# Patient Record
Sex: MALE | Race: WHITE | URBAN - METROPOLITAN AREA
[De-identification: names, ages, dates, MRNs, and addresses within clinical notes are randomized per-mention and may not be internally consistent; named-entity substitution may affect disease eponyms.]

---

## 2019-06-05 ENCOUNTER — CONVERSION ENCOUNTER (OUTPATIENT)
Dept: NEUROLOGY | Facility: CLINIC | Age: 54
End: 2019-06-05

## 2019-06-05 ENCOUNTER — OFFICE VISIT CONVERTED (OUTPATIENT)
Dept: NEUROSURGERY | Facility: CLINIC | Age: 54
End: 2019-06-05
Attending: PHYSICIAN ASSISTANT

## 2019-06-27 ENCOUNTER — OFFICE VISIT CONVERTED (OUTPATIENT)
Dept: NEUROSURGERY | Facility: CLINIC | Age: 54
End: 2019-06-27
Attending: PHYSICIAN ASSISTANT

## 2021-05-15 VITALS
SYSTOLIC BLOOD PRESSURE: 135 MMHG | DIASTOLIC BLOOD PRESSURE: 91 MMHG | WEIGHT: 175.12 LBS | HEIGHT: 69 IN | HEART RATE: 74 BPM | BODY MASS INDEX: 25.94 KG/M2

## 2021-05-15 VITALS
BODY MASS INDEX: 25.7 KG/M2 | HEART RATE: 59 BPM | SYSTOLIC BLOOD PRESSURE: 124 MMHG | WEIGHT: 173.5 LBS | DIASTOLIC BLOOD PRESSURE: 87 MMHG | HEIGHT: 69 IN

## 2023-10-17 ENCOUNTER — OFFICE VISIT (OUTPATIENT)
Dept: NEUROSURGERY | Facility: CLINIC | Age: 58
End: 2023-10-17
Payer: MEDICAID

## 2023-10-17 VITALS
DIASTOLIC BLOOD PRESSURE: 83 MMHG | SYSTOLIC BLOOD PRESSURE: 162 MMHG | HEIGHT: 69 IN | BODY MASS INDEX: 27.64 KG/M2 | WEIGHT: 186.6 LBS

## 2023-10-17 DIAGNOSIS — M48.02 FORAMINAL STENOSIS OF CERVICAL REGION: Primary | ICD-10-CM

## 2023-10-17 RX ORDER — LEVOCETIRIZINE DIHYDROCHLORIDE 5 MG/1
5 TABLET, FILM COATED ORAL EVERY EVENING
COMMUNITY

## 2023-10-17 RX ORDER — ATORVASTATIN CALCIUM 20 MG/1
20 TABLET, FILM COATED ORAL NIGHTLY
COMMUNITY

## 2023-10-17 RX ORDER — LISINOPRIL 20 MG/1
20 TABLET ORAL DAILY
COMMUNITY

## 2023-10-17 RX ORDER — PANTOPRAZOLE SODIUM 40 MG/1
40 TABLET, DELAYED RELEASE ORAL DAILY
COMMUNITY

## 2023-10-17 RX ORDER — GABAPENTIN 300 MG/1
300 CAPSULE ORAL 3 TIMES DAILY
COMMUNITY
Start: 2023-10-09

## 2023-10-17 RX ORDER — BUSPIRONE HYDROCHLORIDE 30 MG/1
30 TABLET ORAL 3 TIMES DAILY
COMMUNITY

## 2023-10-17 NOTE — PROGRESS NOTES
"Chief Complaint  Neck Pain    Subjective          Vasu Cardoza who is a 58 y.o. year old male who presents to [facility] for Evaluation of the Spine.     The patient complains of pain located in the lumbar spine, cervical spine and left arm.  Patients states the pain has been present for 1 month in the neck and the arm for 2 weeks.  The pain came on acutely.  His lower back pain has been present a lot longer (several months or more). The pain scale level is 5.  The pain  radiates into the 2nd and 3rd digit on the left .  The pain is constant and waxing/waning and described as sharp, dull, aching, burning, and throbbing.  The back pain has overall eased off. The pain is worse at no particular time of day. Patient states  laying flat down or stretching worsens the pain.  Patient states  propping up the arm  makes the pain better.    Associated Symptoms Include: Numbness and Tingling  Conservative Interventions Include:  PT and NSAIDs , Gabapentin    Was this the result of an injury or accident? : No    History of Previous Spinal Surgery?: No    This patient  reports that he has never smoked. He has never used smokeless tobacco.    Review of Systems   Musculoskeletal:  Positive for myalgias and neck pain.   Neurological:  Positive for numbness.        Objective   Vital Signs:   /83 (BP Location: Left arm, Patient Position: Sitting)   Ht 175.3 cm (69\")   Wt 84.6 kg (186 lb 9.6 oz)   BMI 27.56 kg/m²       Physical Exam  Constitutional:       Appearance: He is normal weight.   Neck:      Comments: Decreased ROM with some increase in pain, particularly with extension.   Pulmonary:      Effort: Pulmonary effort is normal.   Neurological:      Mental Status: He is alert.      Sensory: No sensory deficit.      Motor: No weakness.      Deep Tendon Reflexes: Reflexes normal (1/4, neg Hoffmans).   Psychiatric:         Mood and Affect: Mood normal.          Result Review : "       I personally reviewed the patient's MRI scan which shows some C6-7 foraminal narrowing with a possible small disc herniation to the left. He has multiple small disc bulges.     Assessment and Plan    Diagnoses and all orders for this visit:    1. Foraminal stenosis of cervical region (Primary)    He will continue with his PT and f/u in 4 weeks. He can add traction back to the PT.     He will try to slowly add back in the activities he has been restricting, but avoid heavy jarring activities.       Follow Up   Return in about 1 month (around 11/17/2023).  Patient was given instructions and counseling regarding his condition or for health maintenance advice. Please see specific information pulled into the AVS if appropriate.

## 2023-11-21 ENCOUNTER — OFFICE VISIT (OUTPATIENT)
Dept: NEUROSURGERY | Facility: CLINIC | Age: 58
End: 2023-11-21
Payer: MEDICAID

## 2023-11-21 VITALS
WEIGHT: 192.8 LBS | DIASTOLIC BLOOD PRESSURE: 85 MMHG | HEIGHT: 69 IN | SYSTOLIC BLOOD PRESSURE: 133 MMHG | BODY MASS INDEX: 28.56 KG/M2

## 2023-11-21 DIAGNOSIS — M48.02 FORAMINAL STENOSIS OF CERVICAL REGION: Primary | ICD-10-CM

## 2024-01-04 ENCOUNTER — OFFICE VISIT (OUTPATIENT)
Dept: NEUROSURGERY | Facility: CLINIC | Age: 59
End: 2024-01-04
Payer: MEDICAID

## 2024-01-04 VITALS
SYSTOLIC BLOOD PRESSURE: 138 MMHG | WEIGHT: 198.5 LBS | HEIGHT: 69 IN | BODY MASS INDEX: 29.4 KG/M2 | DIASTOLIC BLOOD PRESSURE: 79 MMHG

## 2024-01-04 DIAGNOSIS — M48.02 FORAMINAL STENOSIS OF CERVICAL REGION: Primary | ICD-10-CM

## 2024-01-04 NOTE — PROGRESS NOTES
Vasu Cardoza is a 58 y.o. male that presents with Neck Pain       He had a cervical epidural. He has some tingling into the whole hand (intermittent). He has started to work out some without any increase in pain. He rates his current level of pain at about 5.    Neck Pain   Associated symptoms include numbness.       Review of Systems   Musculoskeletal:  Positive for neck pain.   Neurological:  Positive for numbness.        Vitals:    01/04/24 0825   BP: 138/79        He is awake and alert.     Oriented X 3.    Triceps appears well preserved.        Assessment and Plan {CC Problem List  Visit Diagnosis  ROS  Review (Popup)  [facility] Maintenance  Quality  BestPractice  Medications  SmartSets  SnapShot Encounters  Media :23}   Problem List Items Addressed This Visit    None  Visit Diagnoses       Foraminal stenosis of cervical region    -  Primary        He had some improvement with his first CESB. He would like to try and avoid surgery if possible. He will pursue at least one additional CESB and if not helping, consider surgery at that.     He will call after his next injection to give a progress report.     Follow Up {Instructions Charge Capture  Follow-up Communications :23}   No follow-ups on file.

## 2024-06-16 ENCOUNTER — HOSPITAL ENCOUNTER (EMERGENCY)
Facility: HOSPITAL | Age: 59
Discharge: HOME | End: 2024-06-16
Attending: EMERGENCY MEDICINE | Admitting: EMERGENCY MEDICINE
Payer: MEDICAID

## 2024-06-16 VITALS
DIASTOLIC BLOOD PRESSURE: 80 MMHG | TEMPERATURE: 98 F | HEART RATE: 76 BPM | WEIGHT: 177.69 LBS | OXYGEN SATURATION: 100 % | RESPIRATION RATE: 16 BRPM | SYSTOLIC BLOOD PRESSURE: 127 MMHG | HEIGHT: 69 IN | BODY MASS INDEX: 26.32 KG/M2

## 2024-06-16 DIAGNOSIS — B02.29 HERPES ZOSTER INVOLVING CERVICAL DERMATOME: Primary | ICD-10-CM

## 2024-06-16 PROCEDURE — 99283 EMERGENCY DEPT VISIT LOW MDM: CPT

## 2024-06-16 RX ORDER — OXYCODONE AND ACETAMINOPHEN 7.5; 325 MG/1; MG/1
1 TABLET ORAL ONCE
Status: COMPLETED | OUTPATIENT
Start: 2024-06-16 | End: 2024-06-16

## 2024-06-16 RX ORDER — IBUPROFEN 800 MG/1
800 TABLET ORAL EVERY 8 HOURS PRN
Qty: 20 TABLET | Refills: 0 | Status: SHIPPED | OUTPATIENT
Start: 2024-06-16

## 2024-06-16 RX ORDER — VALACYCLOVIR HYDROCHLORIDE 1 G/1
1000 TABLET, FILM COATED ORAL 3 TIMES DAILY
Qty: 21 TABLET | Refills: 0 | Status: SHIPPED | OUTPATIENT
Start: 2024-06-16

## 2024-06-16 RX ORDER — HYDROCODONE BITARTRATE AND ACETAMINOPHEN 7.5; 325 MG/1; MG/1
1 TABLET ORAL EVERY 6 HOURS PRN
Qty: 12 TABLET | Refills: 0 | Status: SHIPPED | OUTPATIENT
Start: 2024-06-16

## 2024-06-16 RX ADMIN — OXYCODONE HYDROCHLORIDE AND ACETAMINOPHEN 1 TABLET: 7.5; 325 TABLET ORAL at 15:15

## 2024-06-16 NOTE — DISCHARGE INSTRUCTIONS
Looks like you have a case of shingles (herpes zoster) causing the rash and pain shooting down the left side of your neck and shoulder, for which you should take the antiviral medication (valacyclovir) as directed this week and also try to use some ibuprofen for pain and only use the stronger pain medicine as needed for severe pain.    Also take the methylprednisolone steroid you were prescribed earlier as an anti-inflammatory.    Avoid any close contact with  infants or pregnant females.

## 2024-06-16 NOTE — ED PROVIDER NOTES
Time: 3:06 PM EDT  Date of encounter:  6/16/2024  Independent Historian/Clinical History and Information was obtained by:   Patient and Family    History is limited by: N/A    Chief Complaint: Left-sided neck pain, rash      History of Present Illness:  Patient is a 59 y.o. year old male who presents to the emergency department for evaluation of acute left-sided neck pain, red rash down the left side of the neck towards the shoulder for the past couple days.    He is already seen by urgent care twice for this neck pain and diagnosed with possible cervical radiculopathy or muscle spasm and initially started on Flexeril with no relief.    Just earlier he was at urgent care and started on methylprednisolone but not on antiviral yet.    He is not having any fevers, but mostly complains of left-sided neck pain and pain with movement.        HPI    Patient Care Team  Primary Care Provider: Snellen, Danielle, APRN    Past Medical History:     No Known Allergies  Past Medical History:   Diagnosis Date    Basal cell carcinoma     Cervical disc disorder 09/15/2023    Hyperlipidemia     Hypertension     Lumbosacral disc disease      Past Surgical History:   Procedure Laterality Date    BASAL CELL CARCINOMA EXCISION      CHOLECYSTECTOMY      KNEE SURGERY       Family History   Problem Relation Age of Onset    Hyperlipidemia Father        Home Medications:  Prior to Admission medications    Medication Sig Start Date End Date Taking? Authorizing Provider   atorvastatin (LIPITOR) 20 MG tablet Take 1 tablet by mouth Every Night.    Hailey Rhodes MD   busPIRone (BUSPAR) 30 MG tablet Take 1 tablet by mouth 3 (Three) Times a Day.    Hailey Rhodes MD   CeleBREX 200 MG capsule Take 1 capsule by mouth 2 (Two) Times a Day. 10/9/23   Hailey Rhodes MD   gabapentin (NEURONTIN) 300 MG capsule Take 1 capsule by mouth 3 (Three) Times a Day. 10/9/23   Hailey Rhodes MD   HYDROcodone-acetaminophen (NORCO) 7.5-325 MG  "per tablet Take 1 tablet by mouth Every 6 (Six) Hours As Needed for Severe Pain. 6/16/24   Holden Trevino MD   ibuprofen (ADVIL,MOTRIN) 800 MG tablet Take 1 tablet by mouth Every 8 (Eight) Hours As Needed for Moderate Pain or Headache. 6/16/24   Holden Trevino MD   levocetirizine (XYZAL) 5 MG tablet Take 1 tablet by mouth Every Evening.    ProviderHailey MD   lisinopril (PRINIVIL,ZESTRIL) 20 MG tablet Take 1 tablet by mouth Daily.    ProviderHailey MD   pantoprazole (PROTONIX) 40 MG EC tablet Take 1 tablet by mouth Daily.    ProviderHailey MD   valACYclovir (VALTREX) 1000 MG tablet Take 1 tablet by mouth 3 (Three) Times a Day. 6/16/24   Holden Trevino MD        Social History:   Social History     Tobacco Use    Smoking status: Never    Smokeless tobacco: Never   Substance Use Topics    Alcohol use: Not Currently    Drug use: Never         Review of Systems:  Review of Systems   I performed a 10 point review of systems which was all negative, except for the positives found in the HPI above.  Physical Exam:  /80 (BP Location: Right arm, Patient Position: Sitting)   Pulse 76   Temp 98 °F (36.7 °C) (Oral)   Resp 16   Ht 175.3 cm (69.02\")   Wt 80.6 kg (177 lb 11.1 oz)   SpO2 100%   BMI 26.23 kg/m²     Physical Exam   General: Awake alert and in moderate distress due to pain    HEENT: Head normocephalic atraumatic, eyes PERRLA EOMI, nose normal, oropharynx normal.    Neck: Supple full range of motion but significant pain with turning of the neck towards 1 side, no meningismus, no lymphadenopathy, he has vesicular erythematous rash consistent with herpes zoster down the left side of his neck and trapezius and upper chest in a dermatomal distribution.    Heart: Regular rate and rhythm, no murmurs or rubs, 2+ radial pulses bilaterally    Lungs: Clear to auscultation bilaterally without wheezes or crackles, no respiratory distress    Abdomen: Soft, nontender, nondistended, no rebound or " guarding    Skin: Warm, dry, no rash    Musculoskeletal: Normal range of motion, no lower extremity edema    Neurologic: Oriented x3, no motor deficits no sensory deficits    Psychiatric: Mood appears stable, no psychosis          Procedures:  Procedures      Medical Decision Making:      Comorbidities that affect care:    Hypertension    External Notes reviewed:    None      The following orders were placed and all results were independently analyzed by me:  No orders of the defined types were placed in this encounter.      Medications Given in the Emergency Department:  Medications   oxyCODONE-acetaminophen (PERCOCET) 7.5-325 MG per tablet 1 tablet (has no administration in time range)        ED Course:         Labs:    Lab Results (last 24 hours)       ** No results found for the last 24 hours. **             Imaging:    No Radiology Exams Resulted Within Past 24 Hours      Differential Diagnosis and Discussion:    Neck Pain: The patient presents with neck pain. My differential diagnosis includes but is not limited to acute spinal epidural abscess, acute spinal epidural bleed, meningitis, musculoskeletal neck pain, spinal fracture, and osteoarthritis.   Rash: Differential diagnosis includes but is not limited to sepsis, cellulitis, [place] Spotted Fever, meningitis, meningococcemia, Varicella, Strep infection, dermatitis, allergic reaction, Lyme disease, and toxic shock syndrome.        MDM         This patient is a pleasant 59-year-old male presenting with left-sided neck pain for the past few days not getting better with muscle relaxant prescribed earlier urgent care.    However, at this visit he is now noted to have a superimposed erythematous vesicular rash down the entire left side of his neck extending to the left upper chest wall and left trapezius muscle.    It looks consistent clinically with herpes zoster rash.    I am starting him on Valtrex and he was already prescribed some corticosteroids at  urgent care the other day and I will call in a couple days of pain meds and give him supportive care instructions and follow-up instructions.                Patient Care Considerations:          Consultants/Shared Management Plan:        Social Determinants of Health:    Patient is independent, reliable, and has access to care.       Disposition and Care Coordination:    Discharged: The patient is suitable and stable for discharge with no need for consideration of admission.    I have explained the patient´s condition, diagnoses and treatment plan based on the information available to me at this time. I have answered questions and addressed any concerns. The patient has a good  understanding of the patient´s diagnosis, condition, and treatment plan as can be expected at this point. The vital signs have been stable. The patient´s condition is stable and appropriate for discharge from the emergency department.      The patient will pursue further outpatient evaluation with the primary care physician or other designated or consulting physician as outlined in the discharge instructions. They are agreeable to this plan of care and follow-up instructions have been explained in detail. The patient has received these instructions in written format and has expressed an understanding of the discharge instructions. The patient is aware that any significant change in condition or worsening of symptoms should prompt an immediate return to this or the closest emergency department or call to 911.  I have explained discharge medications and the need for follow up with the patient/caretakers. This was also printed in the discharge instructions. Patient was discharged with the following medications and follow up:      Medication List        New Prescriptions      HYDROcodone-acetaminophen 7.5-325 MG per tablet  Commonly known as: NORCO  Take 1 tablet by mouth Every 6 (Six) Hours As Needed for Severe Pain.     ibuprofen 800 MG  tablet  Commonly known as: ADVIL,MOTRIN  Take 1 tablet by mouth Every 8 (Eight) Hours As Needed for Moderate Pain or Headache.     valACYclovir 1000 MG tablet  Commonly known as: VALTREX  Take 1 tablet by mouth 3 (Three) Times a Day.               Where to Get Your Medications        These medications were sent to [facility]/pharmacy #[id number] - [place], [place] - [address] - [phone] [facility] [phone]   [address] [place] [address]      Hours: 24-hours Phone: [phone]   HYDROcodone-acetaminophen 7.5-325 MG per tablet  ibuprofen 800 MG tablet  valACYclovir 1000 MG tablet      Snellen, Danielle, GERALD  [facility] [address] [phone]    Call in 2 days  As needed, If symptoms worsen, for a follow-up appointment       Final diagnoses:   Herpes zoster involving cervical dermatome        ED Disposition       ED Disposition   Discharge    Condition   Stable    Comment   --               This medical record created using voice recognition software.             Holden Trevino MD  06/16/24 7475